# Patient Record
Sex: MALE | Race: WHITE | NOT HISPANIC OR LATINO | Employment: OTHER | URBAN - METROPOLITAN AREA
[De-identification: names, ages, dates, MRNs, and addresses within clinical notes are randomized per-mention and may not be internally consistent; named-entity substitution may affect disease eponyms.]

---

## 2018-04-24 ENCOUNTER — RECORDS - HEALTHEAST (OUTPATIENT)
Dept: LAB | Facility: CLINIC | Age: 63
End: 2018-04-24

## 2018-04-24 LAB
CHOLEST SERPL-MCNC: 168 MG/DL
FASTING STATUS PATIENT QL REPORTED: NORMAL
HDLC SERPL-MCNC: 50 MG/DL
LDLC SERPL CALC-MCNC: 105 MG/DL
PSA SERPL-MCNC: 0.3 NG/ML (ref 0–4.5)
TRIGL SERPL-MCNC: 65 MG/DL

## 2018-04-25 LAB — HCV AB SERPL QL IA: NEGATIVE

## 2019-11-05 ENCOUNTER — RECORDS - HEALTHEAST (OUTPATIENT)
Dept: LAB | Facility: CLINIC | Age: 64
End: 2019-11-05

## 2019-11-07 LAB — BACTERIA SPEC CULT: NORMAL

## 2021-05-26 ENCOUNTER — RECORDS - HEALTHEAST (OUTPATIENT)
Dept: ADMINISTRATIVE | Facility: CLINIC | Age: 66
End: 2021-05-26

## 2021-05-29 ENCOUNTER — HEALTH MAINTENANCE LETTER (OUTPATIENT)
Age: 66
End: 2021-05-29

## 2021-05-29 ENCOUNTER — RECORDS - HEALTHEAST (OUTPATIENT)
Dept: ADMINISTRATIVE | Facility: CLINIC | Age: 66
End: 2021-05-29

## 2021-06-16 PROBLEM — N23 RENAL COLIC ON RIGHT SIDE: Status: ACTIVE | Noted: 2020-06-18

## 2021-07-24 ENCOUNTER — HEALTH MAINTENANCE LETTER (OUTPATIENT)
Age: 66
End: 2021-07-24

## 2021-09-18 ENCOUNTER — HEALTH MAINTENANCE LETTER (OUTPATIENT)
Age: 66
End: 2021-09-18

## 2021-10-13 ENCOUNTER — LAB REQUISITION (OUTPATIENT)
Dept: LAB | Facility: CLINIC | Age: 66
End: 2021-10-13
Payer: COMMERCIAL

## 2021-10-13 DIAGNOSIS — Z03.818 ENCOUNTER FOR OBSERVATION FOR SUSPECTED EXPOSURE TO OTHER BIOLOGICAL AGENTS RULED OUT: ICD-10-CM

## 2021-10-13 PROCEDURE — U0005 INFEC AGEN DETEC AMPLI PROBE: HCPCS | Mod: ORL | Performed by: PHYSICIAN ASSISTANT

## 2021-10-14 LAB — SARS-COV-2 RNA RESP QL NAA+PROBE: NEGATIVE

## 2021-12-20 ENCOUNTER — LAB REQUISITION (OUTPATIENT)
Dept: LAB | Facility: CLINIC | Age: 66
End: 2021-12-20
Payer: COMMERCIAL

## 2021-12-20 ENCOUNTER — LAB REQUISITION (OUTPATIENT)
Dept: LAB | Facility: CLINIC | Age: 66
End: 2021-12-20

## 2021-12-20 DIAGNOSIS — J02.9 ACUTE PHARYNGITIS, UNSPECIFIED: ICD-10-CM

## 2021-12-20 DIAGNOSIS — Z03.818 ENCOUNTER FOR OBSERVATION FOR SUSPECTED EXPOSURE TO OTHER BIOLOGICAL AGENTS RULED OUT: ICD-10-CM

## 2021-12-20 PROCEDURE — 87081 CULTURE SCREEN ONLY: CPT | Performed by: PHYSICIAN ASSISTANT

## 2021-12-20 PROCEDURE — U0005 INFEC AGEN DETEC AMPLI PROBE: HCPCS | Mod: ORL | Performed by: PHYSICIAN ASSISTANT

## 2021-12-21 LAB — SARS-COV-2 RNA RESP QL NAA+PROBE: NEGATIVE

## 2021-12-23 LAB — BACTERIA SPEC CULT: NORMAL

## 2021-12-27 ENCOUNTER — LAB REQUISITION (OUTPATIENT)
Dept: LAB | Facility: CLINIC | Age: 66
End: 2021-12-27

## 2021-12-27 DIAGNOSIS — Z12.5 ENCOUNTER FOR SCREENING FOR MALIGNANT NEOPLASM OF PROSTATE: ICD-10-CM

## 2021-12-27 DIAGNOSIS — Z13.6 ENCOUNTER FOR SCREENING FOR CARDIOVASCULAR DISORDERS: ICD-10-CM

## 2021-12-27 LAB
CHOLEST SERPL-MCNC: 178 MG/DL
HDLC SERPL-MCNC: 62 MG/DL
LDLC SERPL CALC-MCNC: 106 MG/DL
PSA SERPL-MCNC: 0.28 UG/L (ref 0–4.5)
TRIGL SERPL-MCNC: 48 MG/DL

## 2021-12-27 PROCEDURE — 80061 LIPID PANEL: CPT | Performed by: FAMILY MEDICINE

## 2021-12-27 PROCEDURE — G0103 PSA SCREENING: HCPCS | Performed by: FAMILY MEDICINE

## 2022-05-24 ENCOUNTER — LAB REQUISITION (OUTPATIENT)
Dept: LAB | Facility: CLINIC | Age: 67
End: 2022-05-24

## 2022-05-24 DIAGNOSIS — Z01.818 ENCOUNTER FOR OTHER PREPROCEDURAL EXAMINATION: ICD-10-CM

## 2022-05-24 LAB
ALBUMIN SERPL-MCNC: 4.1 G/DL (ref 3.5–5)
ANION GAP SERPL CALCULATED.3IONS-SCNC: 8 MMOL/L (ref 5–18)
BUN SERPL-MCNC: 13 MG/DL (ref 8–22)
CALCIUM SERPL-MCNC: 9.9 MG/DL (ref 8.5–10.5)
CHLORIDE BLD-SCNC: 104 MMOL/L (ref 98–107)
CO2 SERPL-SCNC: 28 MMOL/L (ref 22–31)
CREAT SERPL-MCNC: 1.16 MG/DL (ref 0.7–1.3)
GFR SERPL CREATININE-BSD FRML MDRD: 70 ML/MIN/1.73M2
GLUCOSE BLD-MCNC: 72 MG/DL (ref 70–125)
PHOSPHATE SERPL-MCNC: 3 MG/DL (ref 2.5–4.5)
POTASSIUM BLD-SCNC: 3.9 MMOL/L (ref 3.5–5)
SODIUM SERPL-SCNC: 140 MMOL/L (ref 136–145)

## 2022-05-24 PROCEDURE — 80069 RENAL FUNCTION PANEL: CPT | Performed by: STUDENT IN AN ORGANIZED HEALTH CARE EDUCATION/TRAINING PROGRAM

## 2022-08-14 ENCOUNTER — HEALTH MAINTENANCE LETTER (OUTPATIENT)
Age: 67
End: 2022-08-14

## 2022-11-19 ENCOUNTER — HEALTH MAINTENANCE LETTER (OUTPATIENT)
Age: 67
End: 2022-11-19

## 2023-09-10 ENCOUNTER — HEALTH MAINTENANCE LETTER (OUTPATIENT)
Age: 68
End: 2023-09-10

## 2024-05-10 ENCOUNTER — HOSPITAL ENCOUNTER (EMERGENCY)
Facility: HOSPITAL | Age: 69
Discharge: HOME | End: 2024-05-10
Admitting: EMERGENCY MEDICINE
Payer: MEDICARE

## 2024-05-10 VITALS
SYSTOLIC BLOOD PRESSURE: 111 MMHG | HEART RATE: 71 BPM | TEMPERATURE: 98.6 F | RESPIRATION RATE: 16 BRPM | DIASTOLIC BLOOD PRESSURE: 65 MMHG | OXYGEN SATURATION: 97 %

## 2024-05-10 DIAGNOSIS — T78.2XXA ANAPHYLAXIS, INITIAL ENCOUNTER: ICD-10-CM

## 2024-05-10 PROCEDURE — 99284 EMERGENCY DEPT VISIT MOD MDM: CPT | Mod: 25

## 2024-05-10 PROCEDURE — 96375 TX/PRO/DX INJ NEW DRUG ADDON: CPT

## 2024-05-10 PROCEDURE — 96374 THER/PROPH/DIAG INJ IV PUSH: CPT

## 2024-05-10 PROCEDURE — 250N000011 HC RX IP 250 OP 636: Performed by: EMERGENCY MEDICINE

## 2024-05-10 RX ORDER — METHYLPREDNISOLONE SODIUM SUCCINATE 125 MG/2ML
125 INJECTION, POWDER, LYOPHILIZED, FOR SOLUTION INTRAMUSCULAR; INTRAVENOUS ONCE
Status: COMPLETED | OUTPATIENT
Start: 2024-05-10 | End: 2024-05-10

## 2024-05-10 RX ORDER — DIPHENHYDRAMINE HCL 25 MG
25 CAPSULE ORAL EVERY 6 HOURS
Qty: 20 CAPSULE | Refills: 0 | Status: SHIPPED | OUTPATIENT
Start: 2024-05-10 | End: 2024-05-15

## 2024-05-10 RX ORDER — EPINEPHRINE 0.3 MG/.3ML
0.3 INJECTION SUBCUTANEOUS PRN
Qty: 0.6 ML | Refills: 0 | Status: SHIPPED | OUTPATIENT
Start: 2024-05-10

## 2024-05-10 RX ORDER — FAMOTIDINE 20 MG/1
20 TABLET, FILM COATED ORAL 2 TIMES DAILY
Qty: 20 TABLET | Refills: 0 | Status: SHIPPED | OUTPATIENT
Start: 2024-05-10 | End: 2024-05-20

## 2024-05-10 RX ORDER — PREDNISONE 20 MG/1
TABLET ORAL
Qty: 10 TABLET | Refills: 0 | Status: SHIPPED | OUTPATIENT
Start: 2024-05-10 | End: 2024-05-20

## 2024-05-10 RX ADMIN — METHYLPREDNISOLONE SODIUM SUCCINATE 125 MG: 125 INJECTION, POWDER, FOR SOLUTION INTRAMUSCULAR; INTRAVENOUS at 12:33

## 2024-05-10 RX ADMIN — FAMOTIDINE 20 MG: 10 INJECTION, SOLUTION INTRAVENOUS at 12:36

## 2024-05-10 ASSESSMENT — ENCOUNTER SYMPTOMS
TROUBLE SWALLOWING: 0
CHILLS: 0
SHORTNESS OF BREATH: 0
FEVER: 0
COUGH: 0
ABDOMINAL PAIN: 0
FACIAL SWELLING: 1
DIARRHEA: 0
NAUSEA: 0
LIGHT-HEADEDNESS: 1
VOMITING: 0

## 2024-05-10 ASSESSMENT — COLUMBIA-SUICIDE SEVERITY RATING SCALE - C-SSRS
2. HAVE YOU ACTUALLY HAD ANY THOUGHTS OF KILLING YOURSELF IN THE PAST MONTH?: NO
6. HAVE YOU EVER DONE ANYTHING, STARTED TO DO ANYTHING, OR PREPARED TO DO ANYTHING TO END YOUR LIFE?: NO

## 2024-05-10 ASSESSMENT — ACTIVITIES OF DAILY LIVING (ADL)
ADLS_ACUITY_SCORE: 35

## 2024-05-10 NOTE — ED TRIAGE NOTES
Triage Assessment (Adult)       Row Name 05/10/24 1159          Triage Assessment    Airway WDL WDL        Respiratory WDL    Respiratory WDL WDL        Skin Circulation/Temperature WDL    Skin Circulation/Temperature WDL WDL        Cardiac WDL    Cardiac WDL WDL        Peripheral/Neurovascular WDL    Peripheral Neurovascular WDL WDL        Cognitive/Neuro/Behavioral WDL    Cognitive/Neuro/Behavioral WDL WDL                   Pt arrives via ems from urgent care with allergic reaction. Pt states within 15 minutes of waking up this morning he developed a rash and hives that spread all over, he then developed throat tightness and facial swelling. Went to urgent care where he had a syncopal episode and was given epi IM, ems was called. Benadryl 50mg given IV by ems.

## 2024-05-10 NOTE — ED NOTES
Bed: JNED-05  Expected date: 5/10/24  Expected time: 11:45 AM  Means of arrival:   Comments:  Allergic reaction/allina

## 2024-05-10 NOTE — DISCHARGE INSTRUCTIONS
You were seen here today for evaluation of after an allergic reaction.  As we discussed, this was an anaphylactic reaction.    I will prescribe you an EpiPen, carry it with you at all times and use it if you ever have symptoms like this again.  You should always present to an emergency department or call 911 after using your EpiPen.    I will prescribe you Benadryl, Pepcid, and steroids to take for the next 5 days.  You can start the steroids tomorrow morning, take the first doses of Benadryl and Pepcid tonight before bed.    Return to the emergency department for any new or worsening symptoms including recurrent severe rash, vomiting, abdominal pain, swelling of your lips or tongue, fever, or any other symptoms that concern you.

## 2024-05-10 NOTE — ED PROVIDER NOTES
EMERGENCY DEPARTMENT ENCOUNTER      NAME: José Miguel Luke  AGE: 68 year old male  YOB: 1955  MRN: [id number]  EVALUATION DATE & TIME: 5/10/2024 11:56 AM    PCP: Dagoberto Harvey    ED PROVIDER: Claudia Torres PA-C      Chief Complaint   Patient presents with    Allergic Reaction         FINAL IMPRESSION:  1. Anaphylaxis, initial encounter          ED COURSE & MEDICAL DECISION MAKING:    Pertinent Labs & Imaging studies reviewed. (See chart for details)    68 year old male presents to the Emergency Department for evaluation of an allergic reaction.    Physical exam is remarkable for a generally well-appearing male who is in no acute distress.  Heart and lung sounds are clear diffusely throughout.  Abdomen is soft and nontender.  Oropharynx is unremarkable appearing with no swelling of the lips or tongue, patient tolerating secretions without difficulty.  He does have urticarial lesions primarily on the extremities, they are blanchable and nontender.  Vital signs are stable and he is afebrile.    The patient was given IV Solu-Medrol and Pepcid here.  He was observed for period of 3 hours without any recurrence or worsening of his symptoms and the rash that was present during my initial evaluation had mostly resolved by the time he was discharged.  I do not think any emergent labs or imaging are indicated at this time, the patient is hemodynamically stable here and clinically improved.  Symptoms are consistent with anaphylaxis, I will discharge him home with prescription for prednisone, Pepcid, Benadryl, and EpiPen.  We discussed indications for use of EpiPen at length and importance of presenting to a hospital if he ever needs to use it.  I advised him to follow-up with his primary care provider for a recheck and I placed a referral to allergy.  Recommend return here for any new or worsening symptoms.  The patient is agreeable with this treatment plan and verbalized understanding.    Medical  Decision Making    History:  Supplemental history from: Family Member/Significant Other  External Record(s) reviewed: Outpatient Record: Clinic visit from earlier today    Work Up:  Chart documentation includes differential considered and any EKGs or imaging independently interpreted by provider, where specified.  In additional to work up documented, I considered the following work up: Documented in chart, if applicable.    External consultation:  Discussion of management with another provider: Documented in chart, if applicable    Complicating factors:  Care impacted by chronic illness: N/A  Care affected by social determinants of health: N/A    Disposition considerations: Discharge. I prescribed additional prescription strength medication(s) as charted. I considered admission, but discharged patient after significant clinical improvement.    ED Course   12:03 PM Performed my initial history and physical exam. Discussed workup in the emergency department, management of symptoms, and likely disposition.   12:59 PM Rechecked. Patient feeling better but still has rash.   2:20 PM Rechecked. Patient feeling significantly improved, rash has resolved. I discussed the plan for discharge with the patient or family and they are agreeable.. We discussed supportive cares at home and reasons for return to the ER including new or worsening symptoms - all questions and concerns addressed. Patient to be discharged by RN.    At the conclusion of the encounter I discussed the results of all of the tests and the disposition. The questions were answered. The patient or family acknowledged understanding and was agreeable with the care plan.     Voice recognition software was used in the creation of this note. Any grammatical or nonsensical errors are due to inherent errors with the software and are not the intention of the writer.     MEDICATIONS GIVEN IN THE EMERGENCY:  Medications   famotidine (PEPCID) injection 20 mg (20 mg  Intravenous $Given 5/10/24 1236)   methylPREDNISolone sodium succinate (solu-MEDROL) injection 125 mg (125 mg Intravenous $Given 5/10/24 1233)       NEW PRESCRIPTIONS STARTED AT TODAY'S ER VISIT  New Prescriptions    DIPHENHYDRAMINE (BENADRYL) 25 MG CAPSULE    Take 1 capsule (25 mg) by mouth every 6 hours for 5 days    EPINEPHRINE (ANY BX GENERIC EQUIV) 0.3 MG/0.3ML INJECTION 2-PACK    Inject 0.3 mLs (0.3 mg) into the muscle as needed for anaphylaxis May repeat one time in 5-15 minutes if response to initial dose is inadequate.    FAMOTIDINE (PEPCID) 20 MG TABLET    Take 1 tablet (20 mg) by mouth 2 times daily for 10 days    PREDNISONE (DELTASONE) 20 MG TABLET    Take two tablets (= 40mg) each day for 5 (five) days            =================================================================    HPI    Patient information was obtained from: Patient, EMS    Use of : N/A         José Miguel Luke is a 68 year old male who presents to the emergency department via EMS with wife for evaluation of an allergic reaction.    The patient states that he woke up around 10:00 this morning, went to have a bowel movement and suddenly began to notice a rash and itching.  The rash was primarily in the groin area and armpits but continued to spread throughout his body.  They went to the clinic for evaluation where he became very faint, weak, and had a brief episode of unresponsiveness.  He also had some swelling of his lower lip.  At that time, clinic staff administered an EpiPen and called EMS.  The patient did become more alert after EpiPen and was given IV Benadryl and route by EMS.  Currently, he notes a mild rash and minimal itching but significantly improved per his report.  He notes he has had rashes in the past before but no history of anaphylaxis.  No known exposures, no recent changes in soaps, medications, lotions, foods, etc.  He did have a similar episode a few weeks ago where he developed a rash but that was  after eating at a potluck meal and he notes that improved with calamine lotion.  He notes he was out in the yard yesterday doing some mowing and weed whipping but did not develop any symptoms at that time.    Currently, he denies any fevers, chills, abdominal pain, nausea, vomiting, diarrhea, chest pain, or difficulty breathing.      REVIEW OF SYSTEMS   Review of Systems   Constitutional:  Negative for chills and fever.   HENT:  Positive for facial swelling (Now resolved). Negative for trouble swallowing.    Respiratory:  Negative for cough and shortness of breath.    Cardiovascular:  Negative for chest pain.   Gastrointestinal:  Negative for abdominal pain, diarrhea, nausea and vomiting.   Skin:  Positive for rash.   Neurological:  Positive for syncope and light-headedness.       All other systems reviewed and are negative unless noted in HPI.      PAST MEDICAL HISTORY:  No past medical history on file.    PAST SURGICAL HISTORY:  No past surgical history on file.    CURRENT MEDICATIONS:    diphenhydrAMINE (BENADRYL) 25 MG capsule  EPINEPHrine (ANY BX GENERIC EQUIV) 0.3 MG/0.3ML injection 2-pack  famotidine (PEPCID) 20 MG tablet  predniSONE (DELTASONE) 20 MG tablet        ALLERGIES:  Allergies   Allergen Reactions    Penicillin [Penicillin G] Unknown       FAMILY HISTORY:  No family history on file.    SOCIAL HISTORY:   Social History     Socioeconomic History    Marital status:        VITALS:  Patient Vitals for the past 24 hrs:   BP Temp Temp src Pulse Resp SpO2   05/10/24 1400 107/64 -- -- 70 -- 97 %   05/10/24 1345 106/64 -- -- 66 -- 95 %   05/10/24 1330 105/63 -- -- 66 -- 95 %   05/10/24 1315 112/66 -- -- 72 -- 98 %   05/10/24 1305 119/71 -- -- 75 -- 98 %   05/10/24 1250 116/67 -- -- 70 -- 99 %   05/10/24 1235 113/70 -- -- 67 -- 97 %   05/10/24 1220 121/74 -- -- 67 -- 98 %   05/10/24 1200 118/70 98.6  F (37  C) Oral 66 16 97 %       PHYSICAL EXAM    VITAL SIGNS: /64   Pulse 70   Temp 98.6  F (37   C) (Oral)   Resp 16   SpO2 97%   General Appearance: Alert, cooperative, normal speech and facial symmetry, appears stated age, the patient does not appear in distress  Head:  Normocephalic, without obvious abnormality, atraumatic  Eyes: Conjunctiva/corneas clear, EOM's intact, no nystagmus, PERRL  ENT:  Lips, mucosa, and tongue normal; teeth and gums normal, no pharyngeal inflammation, no dysphonia or difficulty swallowing, membranes are moist without pallor  Cardio:  Regular rate and rhythm, S1 and S2 normal, no murmur, rub    or gallop, 2+ pulses symmetric in all extremities  Pulm:  Clear to auscultation bilaterally, respirations unlabored with no accessory muscle use  Abdomen:  Abdomen is soft, non-distended with no tenderness to palpation, rebound tenderness, or guarding.   Extremities:  Extremities normal, there is no tenderness to palpation, atraumatic, no cyanosis or edema, full function and range of motion, pulses equal in all extremities, normal cap refill, no joint swelling  Skin: Urticarial lesions primarily on the extremities, they are blanchable and nontender  Neuro: Patient is awake, alert, and responsive to voice. No gross motor weaknesses or sensory loss; moves all extremities.    LAB:  All pertinent labs reviewed and interpreted.  Labs Ordered and Resulted from Time of ED Arrival to Time of ED Departure - No data to display    RADIOLOGY:  Reviewed all pertinent imaging. Please see official radiology report.  No orders to display       PROCEDURES:   Critical Care  Performed by: Claudia Narayanan PA-C  Authorized by: Cluadia Narayanan PA-C    Total critical care time: 30 minutes  Critical care time was exclusive of separately billable procedures and treating other patients.  Critical care was necessary to treat or prevent imminent or life-threatening deterioration of the following conditions: Anaphylaxis  Critical care was time spent personally by me on the following activities: development of treatment  plan with patient or surrogate, discussions with consultants, examination of patient, evaluation of patient's response to treatment, obtaining history from patient or surrogate, ordering and performing treatments and interventions, ordering and review of laboratory studies, ordering and review of radiographic studies and re-evaluation of patient's condition, this excludes any separately billable procedures.      Claudia Torres PA-C  Emergency Medicine  [facility] EMERGENCY DEPARTMENT  [address] [place] [id number] [phone]  Dept: [phone]       Claudia Torres PA-C  05/10/24 5211

## 2024-05-10 NOTE — ED NOTES
Pt states he is feeling much better, has decreased hives and redness now. He will be discharged to home.

## 2024-05-17 ENCOUNTER — HOSPITAL ENCOUNTER (EMERGENCY)
Facility: HOSPITAL | Age: 69
Discharge: HOME | End: 2024-05-17
Attending: EMERGENCY MEDICINE | Admitting: EMERGENCY MEDICINE
Payer: MEDICARE

## 2024-05-17 VITALS
OXYGEN SATURATION: 96 % | SYSTOLIC BLOOD PRESSURE: 125 MMHG | RESPIRATION RATE: 16 BRPM | WEIGHT: 184 LBS | HEIGHT: 69 IN | TEMPERATURE: 97.8 F | DIASTOLIC BLOOD PRESSURE: 69 MMHG | BODY MASS INDEX: 27.25 KG/M2 | HEART RATE: 68 BPM

## 2024-05-17 DIAGNOSIS — T78.40XA ALLERGIC REACTION, INITIAL ENCOUNTER: ICD-10-CM

## 2024-05-17 LAB
ALBUMIN SERPL BCG-MCNC: 3.7 G/DL (ref 3.5–5.2)
ALP SERPL-CCNC: 67 U/L (ref 40–150)
ALT SERPL W P-5'-P-CCNC: 18 U/L (ref 0–70)
ANION GAP SERPL CALCULATED.3IONS-SCNC: 10 MMOL/L (ref 7–15)
AST SERPL W P-5'-P-CCNC: 20 U/L (ref 0–45)
BASOPHILS # BLD AUTO: 0.1 10E3/UL (ref 0–0.2)
BASOPHILS NFR BLD AUTO: 1 %
BILIRUB DIRECT SERPL-MCNC: <0.2 MG/DL (ref 0–0.3)
BILIRUB SERPL-MCNC: 0.7 MG/DL
BUN SERPL-MCNC: 13.2 MG/DL (ref 8–23)
CALCIUM SERPL-MCNC: 8.8 MG/DL (ref 8.8–10.2)
CHLORIDE SERPL-SCNC: 104 MMOL/L (ref 98–107)
CREAT SERPL-MCNC: 1.11 MG/DL (ref 0.67–1.17)
DEPRECATED HCO3 PLAS-SCNC: 25 MMOL/L (ref 22–29)
EGFRCR SERPLBLD CKD-EPI 2021: 73 ML/MIN/1.73M2
EOSINOPHIL # BLD AUTO: 0.1 10E3/UL (ref 0–0.7)
EOSINOPHIL NFR BLD AUTO: 1 %
ERYTHROCYTE [DISTWIDTH] IN BLOOD BY AUTOMATED COUNT: 13 % (ref 10–15)
GLUCOSE SERPL-MCNC: 93 MG/DL (ref 70–99)
HCT VFR BLD AUTO: 42.6 % (ref 40–53)
HGB BLD-MCNC: 14.6 G/DL (ref 13.3–17.7)
IMM GRANULOCYTES # BLD: 0.1 10E3/UL
IMM GRANULOCYTES NFR BLD: 1 %
LYMPHOCYTES # BLD AUTO: 3.2 10E3/UL (ref 0.8–5.3)
LYMPHOCYTES NFR BLD AUTO: 38 %
MCH RBC QN AUTO: 31.5 PG (ref 26.5–33)
MCHC RBC AUTO-ENTMCNC: 34.3 G/DL (ref 31.5–36.5)
MCV RBC AUTO: 92 FL (ref 78–100)
MONOCYTES # BLD AUTO: 0.9 10E3/UL (ref 0–1.3)
MONOCYTES NFR BLD AUTO: 10 %
NEUTROPHILS # BLD AUTO: 4.1 10E3/UL (ref 1.6–8.3)
NEUTROPHILS NFR BLD AUTO: 49 %
NRBC # BLD AUTO: 0 10E3/UL
NRBC BLD AUTO-RTO: 0 /100
PLATELET # BLD AUTO: 211 10E3/UL (ref 150–450)
POTASSIUM SERPL-SCNC: 3.5 MMOL/L (ref 3.4–5.3)
PROT SERPL-MCNC: 6.6 G/DL (ref 6.4–8.3)
RBC # BLD AUTO: 4.63 10E6/UL (ref 4.4–5.9)
SODIUM SERPL-SCNC: 139 MMOL/L (ref 135–145)
WBC # BLD AUTO: 8.3 10E3/UL (ref 4–11)

## 2024-05-17 PROCEDURE — 250N000011 HC RX IP 250 OP 636: Performed by: EMERGENCY MEDICINE

## 2024-05-17 PROCEDURE — 250N000013 HC RX MED GY IP 250 OP 250 PS 637: Performed by: EMERGENCY MEDICINE

## 2024-05-17 PROCEDURE — 250N000009 HC RX 250: Performed by: EMERGENCY MEDICINE

## 2024-05-17 PROCEDURE — 96361 HYDRATE IV INFUSION ADD-ON: CPT

## 2024-05-17 PROCEDURE — 258N000003 HC RX IP 258 OP 636: Performed by: EMERGENCY MEDICINE

## 2024-05-17 PROCEDURE — 82374 ASSAY BLOOD CARBON DIOXIDE: CPT | Performed by: EMERGENCY MEDICINE

## 2024-05-17 PROCEDURE — 96374 THER/PROPH/DIAG INJ IV PUSH: CPT

## 2024-05-17 PROCEDURE — 99284 EMERGENCY DEPT VISIT MOD MDM: CPT | Mod: 25

## 2024-05-17 PROCEDURE — 36415 COLL VENOUS BLD VENIPUNCTURE: CPT | Performed by: EMERGENCY MEDICINE

## 2024-05-17 PROCEDURE — 82248 BILIRUBIN DIRECT: CPT | Performed by: EMERGENCY MEDICINE

## 2024-05-17 PROCEDURE — 82565 ASSAY OF CREATININE: CPT | Performed by: EMERGENCY MEDICINE

## 2024-05-17 PROCEDURE — 85048 AUTOMATED LEUKOCYTE COUNT: CPT | Performed by: EMERGENCY MEDICINE

## 2024-05-17 RX ORDER — METHYLPREDNISOLONE SODIUM SUCCINATE 125 MG/2ML
125 INJECTION, POWDER, LYOPHILIZED, FOR SOLUTION INTRAMUSCULAR; INTRAVENOUS ONCE
Status: COMPLETED | OUTPATIENT
Start: 2024-05-17 | End: 2024-05-17

## 2024-05-17 RX ORDER — CETIRIZINE HYDROCHLORIDE 5 MG/1
5 TABLET ORAL ONCE
Status: COMPLETED | OUTPATIENT
Start: 2024-05-17 | End: 2024-05-17

## 2024-05-17 RX ORDER — METHYLPREDNISOLONE 4 MG
TABLET, DOSE PACK ORAL
Qty: 21 TABLET | Refills: 0 | Status: SHIPPED | OUTPATIENT
Start: 2024-05-17

## 2024-05-17 RX ORDER — CETIRIZINE HYDROCHLORIDE 10 MG/1
5 TABLET ORAL 2 TIMES DAILY PRN
Qty: 10 TABLET | Refills: 0 | Status: SHIPPED | OUTPATIENT
Start: 2024-05-17 | End: 2024-05-20

## 2024-05-17 RX ADMIN — METHYLPREDNISOLONE SODIUM SUCCINATE 125 MG: 125 INJECTION, POWDER, FOR SOLUTION INTRAMUSCULAR; INTRAVENOUS at 10:26

## 2024-05-17 RX ADMIN — CETIRIZINE HYDROCHLORIDE 5 MG: 5 TABLET ORAL at 11:25

## 2024-05-17 RX ADMIN — EPINEPHRINE 0.5 MG: 1 INJECTION INTRAMUSCULAR; INTRAVENOUS; SUBCUTANEOUS at 10:24

## 2024-05-17 RX ADMIN — SODIUM CHLORIDE 500 ML: 9 INJECTION, SOLUTION INTRAVENOUS at 10:28

## 2024-05-17 ASSESSMENT — ENCOUNTER SYMPTOMS
SHORTNESS OF BREATH: 0
DIARRHEA: 0
VOMITING: 0
ABDOMINAL PAIN: 0
COUGH: 0

## 2024-05-17 ASSESSMENT — ACTIVITIES OF DAILY LIVING (ADL)
ADLS_ACUITY_SCORE: 35

## 2024-05-17 NOTE — ED TRIAGE NOTES
"Patient arrives to ED for evaluation of hives and itching, has visible hives on both upper extremities. States that he woke up with the hives this morning. States that he was seen a week ago for similar complaint and required transfer from primary clinic to ED via ambulance and almost passed out during incidient. States that at that time he was giving a shot of epinephrine, pepcid and benadryl. Denies any known allergies. He does state that he was discharged with a prescription for 2 epi pens, however did not use epi pens this morning when he noticed himself having an allergic reaction. When asked why he did not use the epi pen patient states that he did not feel his symptoms were \"bad enough\" to use an epi pen and was waiting for symptoms to get worse.         "

## 2024-05-17 NOTE — ED PROVIDER NOTES
EMERGENCY DEPARTMENT ENCOUNTER      NAME: José Miguel Luke  AGE: 68 year old male  YOB: 1955  MRN: [id number]  EVALUATION DATE & TIME: 5/17/2024  9:49 AM    PCP: Clinic, [facility]    ED PROVIDER: Antonette Meredith M.D.      CHIEF COMPLAINT     Chief Complaint   Patient presents with    Hives    Pruritis    Allergic Reaction                FINAL IMPRESSION:     1. Allergic reaction, initial encounter          MEDICAL DECISION MAKING:       Pertinent Labs & Imaging studies reviewed. (See chart for details)    68 year old male presents to the Emergency Department for evaluation of hives lips swelling    History  Supplemental history from son and wife  External Record(s) review as documented below    Exam  Well appearing  No respiratory distress  No tripoding  urticaria    Differential Diagnosis include but not limited to rebound allergy infection malignancy among others      Vital Signs: reviewed  EKG: none  Imaging:none  Home meds: reviewed  ED meds: epi solumedrol zyrtec  Fluids:ns  Labs:  K 3.5  Cr 1.11  Wbc 8.3  Hgb 14.6  platelets 211    Clinical Impression and Decision Making    69 yo male presents here with urticaria and feeling upper lip swelling  History of the same  Unknown inciting event except both times the day prior he as been as grandson's baseball game    Took benadryl and pepcid prior to arrival  No longer on prednisone    On exam well appearing  No distress  Skin urticaria  No petechia purpura    Patient states he feels his lips are swollen  Visually no angioedema  But given symptom patient was given epinephrine  Already took benadryl and Pepcid  Given solumedrol and zyrtec    Labs unremarkable    Patient observed in the ED. Complete resolution of all symptoms  Concern about rebound sx therefore longer rx for steroid given, medrol dose pack rx and zyrtec    Referral for Allergy given  Patient called and states appointment not until July.  He already bought Epi pen    He feels  comfortable with discharge  Strict return precautions given         In addition to the work out documented, I considered the following work up antibiotics. No evidence of infection            Medical Decision Making    History:  Supplemental history from: Other: Patient's Son  External Record(s) reviewed: Documented in chart    Work Up:  Chart documentation includes differential considered and any EKGs or imaging independently interpreted by provider, where specified.  In additional to work up documented, I considered the following work up: Documented in chart, if applicable.    External consultation:  Discussion of management with another provider: Documented in chart, if applicable    Complicating factors:  Care impacted by chronic illness: N/A  Care affected by social determinants of health: N/A    Disposition considerations: Discharge. I prescribed additional prescription strength medication(s) as charted. See documentation for any additional details.      Review of External Records  Hospital/Clinic: [facility]   Date: 5/10/24  Allergic reaction with hives, had pulled weeds doing yardwork   Has a history of rheumatic fever    External Consultation      ED COURSE     10:05 AM I met with the patient, obtained history, performed an initial exam, and discussed options and plan for diagnostics and treatment here in the ED.  11:31 AM Rechecked patient.    At the conclusion of the encounter I discussed the results of all of the tests and the disposition. The questions were answered. The patient and patient's son acknowledged understanding and was agreeable with the care plan.         MEDICATIONS GIVEN IN THE EMERGENCY:     Medications   cetirizine (zyrTEC) tablet 5 mg (5 mg Oral $Given 5/17/24 1125)   methylPREDNISolone sodium succinate (solu-MEDROL) injection 125 mg (125 mg Intravenous $Given 5/17/24 1026)   sodium chloride 0.9% BOLUS 500 mL (0 mLs Intravenous Stopped 5/17/24 1221)   EPINEPHrine (ADRENALIN) kit  0.3-0.5 mg (0.5 mg Intramuscular $Given 5/17/24 1024)       NEW PRESCRIPTIONS STARTED AT TODAY'S ER VISIT     Discharge Medication List as of 5/17/2024 12:21 PM        START taking these medications    Details   cetirizine (ZYRTEC) 10 MG tablet Take 0.5 tablets (5 mg) by mouth 2 times daily as needed for allergies (1 tab up to twice a day as needed for itch, rash, hives, allergy), Disp-10 tablet, R-0, E-Prescribe      methylPREDNISolone (MEDROL DOSEPAK) 4 MG tablet therapy pack Follow Package Directions, Disp-21 tablet, R-0, E-Prescribe                =================================================================    HPI     Patient information was obtained from: Patient, Patient's Son    Use of : N/A       José Miguel Luke is a 68 year old male who presents by for evaluation of hives and itchiness    Patient reports he woke up with itchiness around his belt line followed by hives around his neck and both upper arms this morning (5/17/2024) about 30 minutes prior to arrival. This is the second time this happened in the past week and reports his lips feel a bit swollen.Last Thursday (5/17/24 he had been at his grandson's baseball game in the [facility] and also had been doing yard work prior to symptoms then. States he could smell pesticide and herbicide on the field but there was no news surrounding the field he was at. He had been seen in the ED for symptoms and had almost passed out from this and given benadryl and pepcid with improvement.Was sent home with an epi pen,  5 day course of benadryl and prednisone, and a 10 day course of pepcid and 5 day course of prednisone. Last took pepcid and benadryl this morning.    Per nurse triage note, at that time he was given a shot of epinephrine, pepcid and benadryl with improvement.He was discharged with a prescription for 2 epi pens, however did not use epi pens this morning when he noticed himself having an allergic reaction. When asked why he did not use  "the epi pen patient states that he did not feel his symptoms were \"bad enough\" to use an epi pen and was waiting for symptoms to get worse.    Per patient's grandson, last night (5/16/24) he also was at a baseball game. Patient did not have hives yesterday (5/16/24).Patient had changed detergent a month go but otherwise has not had any new exposures to anything that could have triggered his symptoms.     Patient denied any shortness of breath, cough, abdominal pain, vomiting or diarrhea. Per nurse triage note, patient has no known allergies. Denied any recent travel. No other complaints at this time.    Seen at [facility] on 5/10/2024 (~ 1 week ago) for an allergic reaction.    REVIEW OF SYSTEMS   Review of Systems   HENT:          Positive for lip swelling.   Respiratory:  Negative for cough and shortness of breath.    Gastrointestinal:  Negative for abdominal pain, diarrhea and vomiting.   Skin:         Hives around neck and bilateral arms        PAST MEDICAL HISTORY:   History reviewed. No pertinent past medical history.    PAST SURGICAL HISTORY:   History reviewed. No pertinent surgical history.      CURRENT MEDICATIONS:   cetirizine (ZYRTEC) 10 MG tablet  methylPREDNISolone (MEDROL DOSEPAK) 4 MG tablet therapy pack  EPINEPHrine (ANY BX GENERIC EQUIV) 0.3 MG/0.3ML injection 2-pack  famotidine (PEPCID) 20 MG tablet  predniSONE (DELTASONE) 20 MG tablet         ALLERGIES:     Allergies   Allergen Reactions    Penicillin [Penicillin G] Unknown       FAMILY HISTORY:   No family history on file.    SOCIAL HISTORY:     Social History     Socioeconomic History    Marital status:        VITALS:   /69   Pulse 68   Temp 97.8  F (36.6  C) (Oral)   Resp 16   Ht 1.753 m (5' 9\")   Wt 83.5 kg (184 lb)   SpO2 96%   BMI 27.17 kg/m      PHYSICAL EXAM     Physical Exam  Vitals and nursing note reviewed.   Constitutional:       General: He is not in acute distress.     Appearance: Normal " appearance. He is not ill-appearing, toxic-appearing or diaphoretic.   Cardiovascular:      Rate and Rhythm: Normal rate.   Neurological:      Mental Status: He is alert.       Physical Exam   Constitutional:     Head: Atraumatic.     Nose: Nose normal.     Mouth/Throat: Oropharynx is clear and moist. No angioedema no stridor no muffle voice    Eyes: EOM are normal. Pupils are equal, round, and reactive to light.     Ears: Bilateral pearly white tympanic membranes.    Neck: Normal range of motion. Neck supple.     Cardiovascular: Normal rate, regular rhythm and normal heart sounds.  2+ femoral pulses/radial/DP pulses B    Pulmonary/Chest: Normal effort  and breath sounds normal.     Abdominal: soft nontender.    Musculoskeletal: Normal range of motion.     Neurological: Moves upper and lower extremities equally.    Lymphatics: no edema, no calves pain, no palpable cords.    : NA    Skin: Skin is warm and dry. Erythematous plaques that miguel with pressure. No rashes on soles.    Psychiatric: Normal mood and affect. Behavior is normal.         LAB:     All pertinent labs reviewed and interpreted.  Labs Ordered and Resulted from Time of ED Arrival to Time of ED Departure   BASIC METABOLIC PANEL - Normal       Result Value    Sodium 139      Potassium 3.5      Chloride 104      Carbon Dioxide (CO2) 25      Anion Gap 10      Urea Nitrogen 13.2      Creatinine 1.11      GFR Estimate 73      Calcium 8.8      Glucose 93     HEPATIC FUNCTION PANEL - Normal    Protein Total 6.6      Albumin 3.7      Bilirubin Total 0.7      Alkaline Phosphatase 67      AST 20      ALT 18      Bilirubin Direct <0.20     CBC WITH PLATELETS AND DIFFERENTIAL    WBC Count 8.3      RBC Count 4.63      Hemoglobin 14.6      Hematocrit 42.6      MCV 92      MCH 31.5      MCHC 34.3      RDW 13.0      Platelet Count 211      % Neutrophils 49      % Lymphocytes 38      % Monocytes 10      % Eosinophils 1      % Basophils 1      % Immature Granulocytes  1      NRBCs per 100 WBC 0      Absolute Neutrophils 4.1      Absolute Lymphocytes 3.2      Absolute Monocytes 0.9      Absolute Eosinophils 0.1      Absolute Basophils 0.1      Absolute Immature Granulocytes 0.1      Absolute NRBCs 0.0          RADIOLOGY:     Reviewed all pertinent imaging. Please see official radiology report.  No orders to display        EKG:       I have independently reviewed and interpreted the EKG(s) documented above.      PROCEDURES:     Procedures      I, Miguel Dickens , am serving as a scribe to document services personally performed by Dr. Meredith based on my observation and the provider's statements to me. I, Antonette Meredith MD attest that Miguel Dickens  is acting in a scribe capacity, has observed my performance of the services and has documented them in accordance with my direction.    Antonette Meredith M.D.  Emergency Medicine  [facility] EMERGENCY DEPARTMENT  [facility] [facility] [id number]  [phone]  Dept: [phone]       Antonette Meredith MD  05/17/24 4681

## 2024-05-17 NOTE — DISCHARGE INSTRUCTIONS
Read and follow the discharge instructions.    I am Going to do a longer course of prednisone.  And also prescribing Zyrtec is less sleepy that Benadryl.    Referral to the allergy clinic.  Hopefully they call you on Monday or Tuesday.    Make Sure you always have your EpiPen with you.    Laboratory normal.  Return or call 911 if you have chest pain shortness of breath vomiting swelling use the EpiPen and return immediately.

## 2024-05-20 ENCOUNTER — OFFICE VISIT (OUTPATIENT)
Dept: ALLERGY | Facility: CLINIC | Age: 69
End: 2024-05-20
Payer: MEDICARE

## 2024-05-20 VITALS
OXYGEN SATURATION: 98 % | RESPIRATION RATE: 16 BRPM | HEART RATE: 65 BPM | BODY MASS INDEX: 27.25 KG/M2 | HEIGHT: 69 IN | WEIGHT: 184 LBS

## 2024-05-20 DIAGNOSIS — T78.40XA ALLERGIC REACTION, INITIAL ENCOUNTER: ICD-10-CM

## 2024-05-20 DIAGNOSIS — T78.2XXA ANAPHYLAXIS, INITIAL ENCOUNTER: Primary | ICD-10-CM

## 2024-05-20 DIAGNOSIS — L50.9 URTICARIA: ICD-10-CM

## 2024-05-20 PROCEDURE — 99243 OFF/OP CNSLTJ NEW/EST LOW 30: CPT | Performed by: ALLERGY & IMMUNOLOGY

## 2024-05-20 PROCEDURE — 86003 ALLG SPEC IGE CRUDE XTRC EA: CPT | Mod: 90 | Performed by: ALLERGY & IMMUNOLOGY

## 2024-05-20 PROCEDURE — 86003 ALLG SPEC IGE CRUDE XTRC EA: CPT | Performed by: ALLERGY & IMMUNOLOGY

## 2024-05-20 PROCEDURE — 36415 COLL VENOUS BLD VENIPUNCTURE: CPT | Performed by: ALLERGY & IMMUNOLOGY

## 2024-05-20 PROCEDURE — 83520 IMMUNOASSAY QUANT NOS NONAB: CPT | Performed by: ALLERGY & IMMUNOLOGY

## 2024-05-20 PROCEDURE — 99000 SPECIMEN HANDLING OFFICE-LAB: CPT | Performed by: ALLERGY & IMMUNOLOGY

## 2024-05-20 PROCEDURE — 82785 ASSAY OF IGE: CPT | Performed by: ALLERGY & IMMUNOLOGY

## 2024-05-20 RX ORDER — CETIRIZINE HYDROCHLORIDE 10 MG/1
10 TABLET ORAL DAILY
Qty: 90 TABLET | Refills: 0 | Status: SHIPPED | OUTPATIENT
Start: 2024-05-20

## 2024-05-20 NOTE — LETTER
ANAPHYLAXIS ALLERGY PLAN    Name: José Miguel Luke      :  1955    Allergy to:      Weight: 184 lbs 0 oz           Asthma:  No      Do not depend on antihistamines or inhalers (bronchodilators) to treat a severe reaction; USE EPINEPHRINE      MEDICATIONS/DOSES  Epinephrine:    Epinephrine dose:  0.3 mg IM  Antihistamine:  Zyrtec (Cetirizine)  Antihistamine dose:  10 mg        ANAPHYLAXIS ALLERGY PLAN (Page 2)  Patient:  José Miguel Luke  :  1955         Electronically signed on May 20, 2024 by:  Nelia VAZQUEZ MD  Parent/Guardian Authorization Signature:  ___________________________ Date:    FORM PROVIDED [employer] (FARE) ([email or web address]) 2017

## 2024-05-20 NOTE — PATIENT INSTRUCTIONS
Check bloodwork    Cetirizine 10 mg daily for 2-3 weeks    Cetirizine up to 2 tabs twice daily     Vasovagal versus anaphylaxis      Tryptase during attack    Carry epinephrine for now

## 2024-05-20 NOTE — LETTER
5/20/2024         RE: José Miguel Luke  [address] [address]        Dear Colleague,    Thank you for referring your patient, José Miguel Luke, to the [facility]. Please see a copy of my visit note below.          Subjective  José Miguel is a 68 year old, presenting for the following health issues:  Allergy Consult (Couple reaction to unknown source)    HPI     Chief complaint: Allergic reaction    History of present illness: This is a pleasant 68-year-old gentleman accompanied by his wife that I was asked to see for evaluation of allergic reaction by Claudia Torres.  Patient states that 3 weeks ago he was at a barbecue and ate some various foods including bratwurst.  1 hour or 2 later he developed some hives.  He did not think much of it put some calamine lotion on it and Benadryl cream and it seemed to resolve.  He then states last Tuesday he woke up from sleep with hives but proceeded to progress to full body hives with facial swelling including lip swelling.  He went to his primary care clinic where he subsequently passed out.  He states that he was told his blood pressure was low and unreadable.  He was given epinephrine.  He states he was sweating when this happened.  He felt very hot.  He went to the emergency room and he was treated with steroids and antihistamines and discharged.  On Friday his hives returned.  He did not feel that he may have had the same dizziness that he felt on Tuesday, however.  He went to the emergency room where he was treated again accordingly and received epinephrine.  He does have an epinephrine device currently.  He finished his prednisone on Thursday and then the hives returned on Friday.  He is now on a Medrol Dosepak Zyrtec and Pepcid.  He is taking 5 mg of Zyrtec twice daily.  He states many many years ago he had an unexplained episode of hives that they attributed to penicillin but he states he had penicillin since that time without  "reaction.  Other than that he is never had a reaction like this previously.  He states the night before he was at a baseball game outside but denies a history of environmental allergies.  He thought perhaps he was allergic to his wife's laundry detergent or the latex in his new clothing that he was wearing.  He does not have a history of eczema.  No over-the-counter medications that he took such as nonsteroidal anti-inflammatory drugs and overall he is very healthy and does not take any other medications regularly.    Past medical history: History of rotator cuff surgery    Social history: He is retired, non-smoker, , no changes at home    Family history is negative for allergies and asthma          Objective   Pulse 65   Resp 16   Ht 1.753 m (5' 9\")   Wt 83.5 kg (184 lb)   SpO2 98%   BMI 27.17 kg/m    Body mass index is 27.17 kg/m .  Physical Exam     Gen: Pleasant male not in acute distress  HEENT: Eyes no erythema of the bulbar or palpebral conjunctiva, no edema.   Skin: No rashes or lesions  Psych: Alert and appropriate for age    Impression report plan:   Allergic reaction  Would like to check some baseline labs today including tryptase level, IgE to alpha galactose.  Patient is worried about latex but I am less suspicious.  Will check specific IgE to latex as well as environmental allergens but stated environmental allergens should not cause systemic symptoms.  If he were to have another allergic reaction recommend tryptase level be drawn during the reaction.  Recommended a daily Zyrtec for now 10 mg and increasing this to 20 mg daily if he were to have breakthrough symptoms.  I do wonder if he had a vasovagal attack versus a true anaphylactic reaction last Tuesday.  He stated he was sweating during the attack and typically anaphylaxis patients do not sweat.  Carry epinephrine for now and went over when he should use this.        Signed Electronically by: Nelia VAZQUEZ MD      Again, thank you " for allowing me to participate in the care of your patient.        Sincerely,        Nelia VAZQUEZ MD

## 2024-05-20 NOTE — PROGRESS NOTES
Stef Valdez is a 68 year old, presenting for the following health issues:  Allergy Consult (Couple reaction to unknown source)    HPI     Chief complaint: Allergic reaction    History of present illness: This is a pleasant 68-year-old gentleman accompanied by his wife that I was asked to see for evaluation of allergic reaction by Claudia Torres.  Patient states that 3 weeks ago he was at a barbecue and ate some various foods including bratwurst.  1 hour or 2 later he developed some hives.  He did not think much of it put some calamine lotion on it and Benadryl cream and it seemed to resolve.  He then states last Tuesday he woke up from sleep with hives but proceeded to progress to full body hives with facial swelling including lip swelling.  He went to his primary care clinic where he subsequently passed out.  He states that he was told his blood pressure was low and unreadable.  He was given epinephrine.  He states he was sweating when this happened.  He felt very hot.  He went to the emergency room and he was treated with steroids and antihistamines and discharged.  On Friday his hives returned.  He did not feel that he may have had the same dizziness that he felt on Tuesday, however.  He went to the emergency room where he was treated again accordingly and received epinephrine.  He does have an epinephrine device currently.  He finished his prednisone on Thursday and then the hives returned on Friday.  He is now on a Medrol Dosepak Zyrtec and Pepcid.  He is taking 5 mg of Zyrtec twice daily.  He states many many years ago he had an unexplained episode of hives that they attributed to penicillin but he states he had penicillin since that time without reaction.  Other than that he is never had a reaction like this previously.  He states the night before he was at a baseball game outside but denies a history of environmental allergies.  He thought perhaps he was allergic to his wife's laundry  "detergent or the latex in his new clothing that he was wearing.  He does not have a history of eczema.  No over-the-counter medications that he took such as nonsteroidal anti-inflammatory drugs and overall he is very healthy and does not take any other medications regularly.    Past medical history: History of rotator cuff surgery    Social history: He is retired, non-smoker, , no changes at home    Family history is negative for allergies and asthma          Objective    Pulse 65   Resp 16   Ht 1.753 m (5' 9\")   Wt 83.5 kg (184 lb)   SpO2 98%   BMI 27.17 kg/m    Body mass index is 27.17 kg/m .  Physical Exam     Gen: Pleasant male not in acute distress  HEENT: Eyes no erythema of the bulbar or palpebral conjunctiva, no edema.   Skin: No rashes or lesions  Psych: Alert and appropriate for age    Impression report plan:   Allergic reaction  Would like to check some baseline labs today including tryptase level, IgE to alpha galactose.  Patient is worried about latex but I am less suspicious.  Will check specific IgE to latex as well as environmental allergens but stated environmental allergens should not cause systemic symptoms.  If he were to have another allergic reaction recommend tryptase level be drawn during the reaction.  Recommended a daily Zyrtec for now 10 mg and increasing this to 20 mg daily if he were to have breakthrough symptoms.  I do wonder if he had a vasovagal attack versus a true anaphylactic reaction last Tuesday.  He stated he was sweating during the attack and typically anaphylaxis patients do not sweat.  Carry epinephrine for now and went over when he should use this.        Signed Electronically by: Nelia VAZQUEZ MD    "

## 2024-05-21 LAB
LTX IGE QN: <0.1 KU(A)/L
TRYPTASE SERPL-MCNC: 2.8 UG/L

## 2024-05-22 LAB
ALPHA-GAL IGE QN: <0.1 KU/L
DEPRECATED MISC ALLERGEN IGE RAST QL: NORMAL

## 2024-05-23 LAB

## 2024-11-03 ENCOUNTER — HEALTH MAINTENANCE LETTER (OUTPATIENT)
Age: 69
End: 2024-11-03

## 2025-02-21 ENCOUNTER — LAB REQUISITION (OUTPATIENT)
Dept: LAB | Facility: CLINIC | Age: 70
End: 2025-02-21
Payer: MEDICARE

## 2025-02-21 DIAGNOSIS — Z13.6 ENCOUNTER FOR SCREENING FOR CARDIOVASCULAR DISORDERS: ICD-10-CM

## 2025-02-21 DIAGNOSIS — M79.10 MYALGIA, UNSPECIFIED SITE: ICD-10-CM

## 2025-02-21 DIAGNOSIS — Z12.5 ENCOUNTER FOR SCREENING FOR MALIGNANT NEOPLASM OF PROSTATE: ICD-10-CM

## 2025-02-21 LAB
ALBUMIN SERPL BCG-MCNC: 4.3 G/DL (ref 3.5–5.2)
ALP SERPL-CCNC: 78 U/L (ref 40–150)
ALT SERPL W P-5'-P-CCNC: 12 U/L (ref 0–70)
ANION GAP SERPL CALCULATED.3IONS-SCNC: 14 MMOL/L (ref 7–15)
AST SERPL W P-5'-P-CCNC: 20 U/L (ref 0–45)
BILIRUB SERPL-MCNC: 1 MG/DL
BUN SERPL-MCNC: 9.7 MG/DL (ref 8–23)
CALCIUM SERPL-MCNC: 9.5 MG/DL (ref 8.8–10.4)
CHLORIDE SERPL-SCNC: 102 MMOL/L (ref 98–107)
CHOLEST SERPL-MCNC: 158 MG/DL
CK SERPL-CCNC: 135 U/L (ref 39–308)
CREAT SERPL-MCNC: 1.04 MG/DL (ref 0.67–1.17)
CRP SERPL-MCNC: 4.02 MG/L
EGFRCR SERPLBLD CKD-EPI 2021: 78 ML/MIN/1.73M2
ERYTHROCYTE [SEDIMENTATION RATE] IN BLOOD BY WESTERGREN METHOD: 11 MM/HR (ref 0–20)
FASTING STATUS PATIENT QL REPORTED: NORMAL
FASTING STATUS PATIENT QL REPORTED: NORMAL
GLUCOSE SERPL-MCNC: 92 MG/DL (ref 70–99)
HCO3 SERPL-SCNC: 24 MMOL/L (ref 22–29)
HDLC SERPL-MCNC: 62 MG/DL
LDLC SERPL CALC-MCNC: 81 MG/DL
NONHDLC SERPL-MCNC: 96 MG/DL
POTASSIUM SERPL-SCNC: 4 MMOL/L (ref 3.4–5.3)
PROT SERPL-MCNC: 7.2 G/DL (ref 6.4–8.3)
PSA SERPL DL<=0.01 NG/ML-MCNC: 0.28 NG/ML (ref 0–4.5)
SODIUM SERPL-SCNC: 140 MMOL/L (ref 135–145)
TRIGL SERPL-MCNC: 75 MG/DL
TSH SERPL DL<=0.005 MIU/L-ACNC: 0.92 UIU/ML (ref 0.3–4.2)

## 2025-02-21 PROCEDURE — 82550 ASSAY OF CK (CPK): CPT | Mod: ORL | Performed by: FAMILY MEDICINE

## 2025-02-21 PROCEDURE — 80053 COMPREHEN METABOLIC PANEL: CPT | Mod: ORL | Performed by: FAMILY MEDICINE

## 2025-02-21 PROCEDURE — 85652 RBC SED RATE AUTOMATED: CPT | Mod: ORL | Performed by: FAMILY MEDICINE

## 2025-02-21 PROCEDURE — G0103 PSA SCREENING: HCPCS | Mod: ORL | Performed by: FAMILY MEDICINE

## 2025-02-21 PROCEDURE — 86140 C-REACTIVE PROTEIN: CPT | Mod: ORL | Performed by: FAMILY MEDICINE

## 2025-02-21 PROCEDURE — 84443 ASSAY THYROID STIM HORMONE: CPT | Mod: ORL | Performed by: FAMILY MEDICINE

## 2025-02-21 PROCEDURE — 80061 LIPID PANEL: CPT | Mod: ORL | Performed by: FAMILY MEDICINE
